# Patient Record
Sex: FEMALE | Race: WHITE
[De-identification: names, ages, dates, MRNs, and addresses within clinical notes are randomized per-mention and may not be internally consistent; named-entity substitution may affect disease eponyms.]

---

## 2018-09-13 ENCOUNTER — HOSPITAL ENCOUNTER (OUTPATIENT)
Dept: HOSPITAL 88 - CT | Age: 68
End: 2018-09-13
Attending: UROLOGY
Payer: MEDICARE

## 2018-09-13 DIAGNOSIS — N28.1: Primary | ICD-10-CM

## 2018-09-13 LAB
BUN SERPL-MCNC: 17 MG/DL (ref 7–26)
BUN/CREAT SERPL: 17 (ref 6–25)
EGFRCR SERPLBLD CKD-EPI 2021: 54 ML/MIN (ref 60–?)

## 2018-09-13 PROCEDURE — 36415 COLL VENOUS BLD VENIPUNCTURE: CPT

## 2018-09-13 PROCEDURE — 84520 ASSAY OF UREA NITROGEN: CPT

## 2018-09-13 PROCEDURE — 74178 CT ABD&PLV WO CNTR FLWD CNTR: CPT

## 2018-09-13 PROCEDURE — 82565 ASSAY OF CREATININE: CPT

## 2018-09-13 PROCEDURE — 96360 HYDRATION IV INFUSION INIT: CPT

## 2018-09-13 NOTE — DIAGNOSTIC IMAGING REPORT
EXAM: CT Abdomen and Pelvis WITHOUT and WITH contrast  

INDICATION:      

\S\[id number]

\S\1255

\S\CYST OF KIDNEY    



COMPARISON: None.

TECHNIQUE: Abdomen and pelvis were scanned utilizing a multidetector helical

scanner from the lung base to the ischial tuberosities before and after

administration of IV contrast. Coronal and sagittal reformations were obtained.

Routine protocol was performed. Scan was performed when during portal venous

phase.

            IV CONTRAST: 100 mL of Isovue-370

            ORAL CONTRAST: Water

            RADIATION DOSE: Total DLP: 1714.6 mGy*cm

                                            Estimated effective dose: DLP x

0.015 mSv

            COMPLICATIONS: None



FINDINGS:



LINES and TUBES: None.



LOWER CHEST:  Patchy groundglass opacities in both lung bases may represent

focal areas of inflammation.



HEPATOBILIARY:      8 mm hypodense lesion in the posterior right lobe of the

liver on series 7, image 61 likely a cyst. No biliary ductal dilation. 



GALLBLADDER: No radio-opaque stones or sludge.  No wall thickening.



SPLEEN: No splenomegaly. 



PANCREAS: No focal masses or ductal dilatation.  



ADRENALS: No adrenal nodules    



KIDNEYS/URETERS: Kidneys enhance symmetrically.  No hydronephrosis. Few

bilateral nonenhancing parapelvic cysts. The largest cyst in the left kidney

measures 3.6 x 2.7 cm on series 7, image 72. The largest cyst on the right

measures 1.6 cm on image 69. No solid masses or complex cystic lesions.  No

stones.



GI TRACT: No abnormal distention, wall thickening, or evidence of bowel

obstruction.  Scattered diverticulosis throughout the sigmoid colon.  Appendix

is not visualized.



PELVIC ORGANS/BLADDER: Unremarkable.



LYMPH NODES: No lymphadenopathy.



VESSELS: Unremarkable.



PERITONEUM / RETROPERITONEUM: No free air or fluid.



BONES: Degenerative changes of the lower thoracic and lumbar spine.



SOFT TISSUES: Unremarkable.  



IMPRESSION: 



1. Few bilateral simple appearing parapelvic renal cysts, measuring up to 3.6

cm on the left and 1.6 cm on the right. No solid renal masses or complex cystic

lesions.



2. Diverticulosis of the sigmoid colon.



Signed by: Dr. Betty Rosenberg M.D. on 9/13/2018 2:17 PM

## 2019-04-18 ENCOUNTER — HOSPITAL ENCOUNTER (OUTPATIENT)
Dept: HOSPITAL 88 - US | Age: 69
End: 2019-04-18
Attending: UROLOGY
Payer: MEDICARE

## 2019-04-18 DIAGNOSIS — N28.1: Primary | ICD-10-CM

## 2019-04-18 PROCEDURE — 76770 US EXAM ABDO BACK WALL COMP: CPT

## 2019-04-18 NOTE — DIAGNOSTIC IMAGING REPORT
EXAM: Renal Ultrasound



INDICATION:  Renal cyst



COMPARISON: None 



TECHNIQUE: Transverse and longitudinal images of the kidneys and bladder were

obtained.  



FINDINGS:     

Right Kidney: 

Length: Measures 10.7 x 3.8 x 4.3 cm

Appearance: Normal echogenicity.  

Collecting system: No hydronephrosis

Stones: None

Cyst/Mass: No evidence of solid mass. There is a simple appearing parapelvic

cyst in the mid pole, measuring up to 1.1 x 1.1 x 1.2 cm. There is either a

cluster of cysts versus a single cyst with septations, measuring up to 2.1 x

1.7 x 1.7 cm. No associated Doppler flow.



Left Kidney: 

Length: Measures 12.0 x 5.3 x 4.7 cm

Appearance: Normal echogenicity.  

Collecting system: No hydronephrosis

Stones: None

Cyst/Mass: No evidence of solid mass. There is a simple appearing left mid pole

parapelvic cyst, measuring up to 1.9 cm. 



Bladder: 

Unremarkable in appearance. Bilateral ureteral jets are seen.



IMPRESSION:

Bilateral simple appearing renal cysts. 



A cluster of cysts versus a septated cyst within the right mid pole kidney. CT

abdomen from 9/13/2018 demonstrated simple appearing parapelvic cysts. A

follow-up ultrasound is suggested in 6 months to assess for stability.



Signed by: Dr. Dana Monroe MD on 4/18/2019 3:43 PM

## 2020-11-03 ENCOUNTER — HOSPITAL ENCOUNTER (OUTPATIENT)
Dept: HOSPITAL 88 - US | Age: 70
End: 2020-11-03
Attending: UROLOGY
Payer: MEDICARE

## 2020-11-03 DIAGNOSIS — N28.1: Primary | ICD-10-CM

## 2020-11-03 PROCEDURE — 76770 US EXAM ABDO BACK WALL COMP: CPT

## 2020-11-03 NOTE — DIAGNOSTIC IMAGING REPORT
EXAM: Renal Ultrasound

INDICATION:   Renal cyst.  

^[id number]

^0754

^CYST OF KIDNEY  

COMPARISON: 4/18/2019. 9/13/2018. 

TECHNIQUE: Transverse and longitudinal images of the kidneys and bladder were

obtained.  



FINDINGS:     



Right Kidney: 

Length: 10.4 x 3.7 x 4.2 cm

Appearance: Normal echogenicity.  

Collecting system: No hydronephrosis

Stones: None

Cyst/Mass: Multiple peripelvic cysts.



Left Kidney: 

Length: 11.9 x 4.7 x 4.7 cm

Appearance: Normal echogenicity.  

Collecting system: No hydronephrosis

Stones: None

Cyst/Mass: Multiple peripelvic cysts.



Bladder: 

Normal



IMPRESSION:

Multiple right and left peripelvic cysts essentially unchanged.



Signed by: Dr. Kofi Logan M.D. on 11/3/2020 10:00 AM